# Patient Record
Sex: MALE | Race: BLACK OR AFRICAN AMERICAN | NOT HISPANIC OR LATINO | Employment: OTHER | URBAN - METROPOLITAN AREA
[De-identification: names, ages, dates, MRNs, and addresses within clinical notes are randomized per-mention and may not be internally consistent; named-entity substitution may affect disease eponyms.]

---

## 2021-08-13 PROBLEM — L98.9 SKIN LESION: Status: RESOLVED | Noted: 2021-08-13 | Resolved: 2021-08-13

## 2021-08-25 PROBLEM — M25.561 CHRONIC PAIN OF RIGHT KNEE: Status: ACTIVE | Noted: 2021-08-25

## 2021-08-25 PROBLEM — M17.11 PRIMARY OSTEOARTHRITIS OF RIGHT KNEE: Status: ACTIVE | Noted: 2021-08-25

## 2021-08-25 PROBLEM — G89.29 CHRONIC PAIN OF RIGHT KNEE: Status: ACTIVE | Noted: 2021-08-25

## 2021-08-25 PROBLEM — Z94.7 S/P PKP (PENETRATING KERATOPLASTY): Status: ACTIVE | Noted: 2017-04-12

## 2021-08-25 PROBLEM — H18.602 KERATOCONUS OF LEFT EYE: Status: ACTIVE | Noted: 2017-04-12

## 2021-09-15 PROBLEM — Z12.11 ENCOUNTER FOR SCREENING COLONOSCOPY: Status: ACTIVE | Noted: 2021-09-15

## 2021-11-15 PROBLEM — S83.241A TEAR OF MEDIAL MENISCUS OF RIGHT KNEE, CURRENT: Status: ACTIVE | Noted: 2021-11-15

## 2021-12-24 ENCOUNTER — PATIENT MESSAGE (OUTPATIENT)
Dept: ADMINISTRATIVE | Facility: OTHER | Age: 51
End: 2021-12-24

## 2021-12-29 ENCOUNTER — NURSE TRIAGE (OUTPATIENT)
Dept: ADMINISTRATIVE | Facility: CLINIC | Age: 51
End: 2021-12-29

## 2022-01-19 PROBLEM — I10 HYPERTENSION, ESSENTIAL: Status: ACTIVE | Noted: 2022-01-19

## 2022-01-19 PROBLEM — J45.20 MILD INTERMITTENT ASTHMA: Status: ACTIVE | Noted: 2022-01-19

## 2022-01-19 PROBLEM — E11.42 TYPE 2 DIABETES MELLITUS WITH DIABETIC POLYNEUROPATHY, WITHOUT LONG-TERM CURRENT USE OF INSULIN: Status: ACTIVE | Noted: 2022-01-19

## 2022-01-19 PROBLEM — E78.00 HIGH CHOLESTEROL: Status: ACTIVE | Noted: 2022-01-19

## 2022-01-19 PROBLEM — Z98.890 STATUS POST SURGERY: Status: ACTIVE | Noted: 2022-01-19

## 2022-01-19 PROBLEM — R73.09 HEMOGLOBIN A1C GREATER THAN 9%, INDICATING POOR DIABETIC CONTROL: Status: ACTIVE | Noted: 2022-01-19

## 2022-04-05 PROBLEM — R29.898 WEAKNESS OF RIGHT LOWER EXTREMITY: Status: ACTIVE | Noted: 2022-04-05

## 2022-11-15 LAB — CRC RECOMMENDATION EXT: NORMAL

## 2022-11-21 PROBLEM — N52.1 ERECTILE DYSFUNCTION DUE TO ARTERIAL DISEASE: Status: ACTIVE | Noted: 2022-11-21

## 2022-11-21 PROBLEM — N40.0 BPH WITHOUT OBSTRUCTION/LOWER URINARY TRACT SYMPTOMS: Status: ACTIVE | Noted: 2022-11-21

## 2022-11-21 PROBLEM — R79.89 LOW TESTOSTERONE: Status: ACTIVE | Noted: 2022-11-21

## 2022-11-21 PROBLEM — I77.9 ERECTILE DYSFUNCTION DUE TO ARTERIAL DISEASE: Status: ACTIVE | Noted: 2022-11-21

## 2023-01-11 ENCOUNTER — PATIENT OUTREACH (OUTPATIENT)
Dept: ADMINISTRATIVE | Facility: HOSPITAL | Age: 53
End: 2023-01-11

## 2023-01-11 NOTE — PROGRESS NOTES
Population Health Outreach.Records Received, hyper-linked into chart at this time. The following record(s)  below were uploaded for Health Maintenance .        COLONOSCOPY = 11.15.22

## 2024-03-11 PROBLEM — R35.1 NOCTURIA: Status: ACTIVE | Noted: 2024-03-11

## 2024-08-02 PROBLEM — E29.1 MALE HYPOGONADISM: Status: ACTIVE | Noted: 2024-08-02

## 2024-12-30 PROBLEM — J82.83 EOSINOPHILIC ASTHMA: Status: ACTIVE | Noted: 2024-12-30

## 2024-12-30 PROBLEM — J45.50 SEVERE PERSISTENT ASTHMA, UNCOMPLICATED: Status: ACTIVE | Noted: 2024-12-30

## 2025-02-20 ENCOUNTER — PATIENT MESSAGE (OUTPATIENT)
Dept: ADMINISTRATIVE | Facility: OTHER | Age: 55
End: 2025-02-20

## 2025-03-26 ENCOUNTER — PATIENT MESSAGE (OUTPATIENT)
Dept: ADMINISTRATIVE | Facility: OTHER | Age: 55
End: 2025-03-26

## 2025-04-23 ENCOUNTER — PATIENT MESSAGE (OUTPATIENT)
Dept: ADMINISTRATIVE | Facility: OTHER | Age: 55
End: 2025-04-23

## 2025-05-20 PROBLEM — N47.1 PHIMOSIS: Status: ACTIVE | Noted: 2025-05-20

## 2025-05-28 ENCOUNTER — PATIENT MESSAGE (OUTPATIENT)
Dept: ADMINISTRATIVE | Facility: OTHER | Age: 55
End: 2025-05-28

## 2025-06-23 ENCOUNTER — PATIENT MESSAGE (OUTPATIENT)
Dept: ADMINISTRATIVE | Facility: OTHER | Age: 55
End: 2025-06-23

## 2025-06-23 PROBLEM — Z00.00 ENCOUNTER FOR MEDICAL EXAMINATION TO ESTABLISH CARE: Status: ACTIVE | Noted: 2021-09-15

## 2025-07-21 ENCOUNTER — PATIENT MESSAGE (OUTPATIENT)
Dept: ADMINISTRATIVE | Facility: OTHER | Age: 55
End: 2025-07-21

## 2025-07-21 PROBLEM — S90.411A ABRASION, RIGHT GREAT TOE, INITIAL ENCOUNTER: Status: ACTIVE | Noted: 2025-07-21

## 2025-07-21 PROBLEM — E78.2 MIXED HYPERLIPIDEMIA: Status: ACTIVE | Noted: 2025-07-21

## 2025-07-21 PROBLEM — R22.9 LUMP OF SKIN: Status: ACTIVE | Noted: 2025-07-21

## 2025-07-21 PROBLEM — L03.039 PARONYCHIA OF GREAT TOE: Status: ACTIVE | Noted: 2025-07-21
